# Patient Record
Sex: MALE | Race: WHITE | NOT HISPANIC OR LATINO | Employment: UNEMPLOYED | URBAN - METROPOLITAN AREA
[De-identification: names, ages, dates, MRNs, and addresses within clinical notes are randomized per-mention and may not be internally consistent; named-entity substitution may affect disease eponyms.]

---

## 2020-11-07 ENCOUNTER — APPOINTMENT (EMERGENCY)
Dept: RADIOLOGY | Facility: HOSPITAL | Age: 20
End: 2020-11-07
Payer: COMMERCIAL

## 2020-11-07 ENCOUNTER — HOSPITAL ENCOUNTER (OUTPATIENT)
Facility: HOSPITAL | Age: 20
Discharge: HOME | End: 2020-11-08
Attending: EMERGENCY MEDICINE | Admitting: SURGERY
Payer: COMMERCIAL

## 2020-11-07 ENCOUNTER — ANESTHESIA EVENT (EMERGENCY)
Dept: PERIOP | Facility: HOSPITAL | Age: 20
End: 2020-11-07
Payer: COMMERCIAL

## 2020-11-07 ENCOUNTER — ANESTHESIA (EMERGENCY)
Dept: PERIOP | Facility: HOSPITAL | Age: 20
End: 2020-11-07
Payer: COMMERCIAL

## 2020-11-07 VITALS — HEART RATE: 134 BPM

## 2020-11-07 DIAGNOSIS — G89.18 POSTOPERATIVE PAIN: ICD-10-CM

## 2020-11-07 DIAGNOSIS — A41.9 SEPSIS (HCC): ICD-10-CM

## 2020-11-07 DIAGNOSIS — K35.80 ACUTE APPENDICITIS: Primary | ICD-10-CM

## 2020-11-07 PROBLEM — J45.909 ASTHMA: Status: ACTIVE | Noted: 2020-11-07

## 2020-11-07 LAB
ALBUMIN SERPL BCP-MCNC: 4.2 G/DL (ref 3.5–5)
ALP SERPL-CCNC: 68 U/L (ref 46–116)
ALT SERPL W P-5'-P-CCNC: 24 U/L (ref 12–78)
ANION GAP SERPL CALCULATED.3IONS-SCNC: 9 MMOL/L (ref 4–13)
AST SERPL W P-5'-P-CCNC: 20 U/L (ref 5–45)
BASOPHILS # BLD AUTO: 0.05 THOUSANDS/ΜL (ref 0–0.1)
BASOPHILS NFR BLD AUTO: 0 % (ref 0–1)
BILIRUB DIRECT SERPL-MCNC: 0.2 MG/DL (ref 0–0.2)
BILIRUB SERPL-MCNC: 0.7 MG/DL (ref 0.2–1)
BUN SERPL-MCNC: 8 MG/DL (ref 5–25)
CALCIUM SERPL-MCNC: 8.9 MG/DL (ref 8.3–10.1)
CHLORIDE SERPL-SCNC: 103 MMOL/L (ref 100–108)
CO2 SERPL-SCNC: 26 MMOL/L (ref 21–32)
CREAT SERPL-MCNC: 0.94 MG/DL (ref 0.6–1.3)
EOSINOPHIL # BLD AUTO: 0 THOUSAND/ΜL (ref 0–0.61)
EOSINOPHIL NFR BLD AUTO: 0 % (ref 0–6)
ERYTHROCYTE [DISTWIDTH] IN BLOOD BY AUTOMATED COUNT: 12.5 % (ref 11.6–15.1)
GFR SERPL CREATININE-BSD FRML MDRD: 116 ML/MIN/1.73SQ M
GLUCOSE SERPL-MCNC: 118 MG/DL (ref 65–140)
HCT VFR BLD AUTO: 46 % (ref 36.5–49.3)
HGB BLD-MCNC: 15.5 G/DL (ref 12–17)
IMM GRANULOCYTES # BLD AUTO: 0.13 THOUSAND/UL (ref 0–0.2)
IMM GRANULOCYTES NFR BLD AUTO: 1 % (ref 0–2)
LACTATE SERPL-SCNC: 1.3 MMOL/L (ref 0.5–2)
LIPASE SERPL-CCNC: 97 U/L (ref 73–393)
LYMPHOCYTES # BLD AUTO: 0.93 THOUSANDS/ΜL (ref 0.6–4.47)
LYMPHOCYTES NFR BLD AUTO: 4 % (ref 14–44)
MCH RBC QN AUTO: 30.8 PG (ref 26.8–34.3)
MCHC RBC AUTO-ENTMCNC: 33.7 G/DL (ref 31.4–37.4)
MCV RBC AUTO: 92 FL (ref 82–98)
MONOCYTES # BLD AUTO: 1.8 THOUSAND/ΜL (ref 0.17–1.22)
MONOCYTES NFR BLD AUTO: 7 % (ref 4–12)
NEUTROPHILS # BLD AUTO: 21.45 THOUSANDS/ΜL (ref 1.85–7.62)
NEUTS SEG NFR BLD AUTO: 88 % (ref 43–75)
NRBC BLD AUTO-RTO: 0 /100 WBCS
PLATELET # BLD AUTO: 292 THOUSANDS/UL (ref 149–390)
PMV BLD AUTO: 9.7 FL (ref 8.9–12.7)
POTASSIUM SERPL-SCNC: 4 MMOL/L (ref 3.5–5.3)
PROT SERPL-MCNC: 7.7 G/DL (ref 6.4–8.2)
RBC # BLD AUTO: 5.03 MILLION/UL (ref 3.88–5.62)
SARS-COV-2 RNA RESP QL NAA+PROBE: NEGATIVE
SODIUM SERPL-SCNC: 138 MMOL/L (ref 136–145)
WBC # BLD AUTO: 24.36 THOUSAND/UL (ref 4.31–10.16)

## 2020-11-07 PROCEDURE — 83690 ASSAY OF LIPASE: CPT | Performed by: PHYSICIAN ASSISTANT

## 2020-11-07 PROCEDURE — 99285 EMERGENCY DEPT VISIT HI MDM: CPT | Performed by: PHYSICIAN ASSISTANT

## 2020-11-07 PROCEDURE — 44970 LAPAROSCOPY APPENDECTOMY: CPT | Performed by: SURGERY

## 2020-11-07 PROCEDURE — 87040 BLOOD CULTURE FOR BACTERIA: CPT | Performed by: PHYSICIAN ASSISTANT

## 2020-11-07 PROCEDURE — 96365 THER/PROPH/DIAG IV INF INIT: CPT

## 2020-11-07 PROCEDURE — 80048 BASIC METABOLIC PNL TOTAL CA: CPT | Performed by: PHYSICIAN ASSISTANT

## 2020-11-07 PROCEDURE — 85025 COMPLETE CBC W/AUTO DIFF WBC: CPT | Performed by: PHYSICIAN ASSISTANT

## 2020-11-07 PROCEDURE — 44970 LAPAROSCOPY APPENDECTOMY: CPT | Performed by: PHYSICIAN ASSISTANT

## 2020-11-07 PROCEDURE — 99284 EMERGENCY DEPT VISIT MOD MDM: CPT | Performed by: SURGERY

## 2020-11-07 PROCEDURE — 83605 ASSAY OF LACTIC ACID: CPT | Performed by: PHYSICIAN ASSISTANT

## 2020-11-07 PROCEDURE — 36415 COLL VENOUS BLD VENIPUNCTURE: CPT | Performed by: PHYSICIAN ASSISTANT

## 2020-11-07 PROCEDURE — G1004 CDSM NDSC: HCPCS

## 2020-11-07 PROCEDURE — 88304 TISSUE EXAM BY PATHOLOGIST: CPT | Performed by: PATHOLOGY

## 2020-11-07 PROCEDURE — 96375 TX/PRO/DX INJ NEW DRUG ADDON: CPT

## 2020-11-07 PROCEDURE — 99285 EMERGENCY DEPT VISIT HI MDM: CPT

## 2020-11-07 PROCEDURE — 80076 HEPATIC FUNCTION PANEL: CPT | Performed by: PHYSICIAN ASSISTANT

## 2020-11-07 PROCEDURE — 87635 SARS-COV-2 COVID-19 AMP PRB: CPT | Performed by: PHYSICIAN ASSISTANT

## 2020-11-07 PROCEDURE — 74177 CT ABD & PELVIS W/CONTRAST: CPT

## 2020-11-07 RX ORDER — KETOROLAC TROMETHAMINE 30 MG/ML
15 INJECTION, SOLUTION INTRAMUSCULAR; INTRAVENOUS EVERY 6 HOURS SCHEDULED
Status: DISCONTINUED | OUTPATIENT
Start: 2020-11-08 | End: 2020-11-08

## 2020-11-07 RX ORDER — SUCCINYLCHOLINE/SOD CL,ISO/PF 100 MG/5ML
SYRINGE (ML) INTRAVENOUS AS NEEDED
Status: DISCONTINUED | OUTPATIENT
Start: 2020-11-07 | End: 2020-11-07

## 2020-11-07 RX ORDER — MIDAZOLAM HYDROCHLORIDE 2 MG/2ML
INJECTION, SOLUTION INTRAMUSCULAR; INTRAVENOUS AS NEEDED
Status: DISCONTINUED | OUTPATIENT
Start: 2020-11-07 | End: 2020-11-07

## 2020-11-07 RX ORDER — ONDANSETRON 2 MG/ML
INJECTION INTRAMUSCULAR; INTRAVENOUS AS NEEDED
Status: DISCONTINUED | OUTPATIENT
Start: 2020-11-07 | End: 2020-11-07

## 2020-11-07 RX ORDER — SODIUM CHLORIDE 9 MG/ML
125 INJECTION, SOLUTION INTRAVENOUS CONTINUOUS
Status: DISCONTINUED | OUTPATIENT
Start: 2020-11-07 | End: 2020-11-07 | Stop reason: SDUPTHER

## 2020-11-07 RX ORDER — PROPOFOL 10 MG/ML
INJECTION, EMULSION INTRAVENOUS AS NEEDED
Status: DISCONTINUED | OUTPATIENT
Start: 2020-11-07 | End: 2020-11-07

## 2020-11-07 RX ORDER — HYDROMORPHONE HCL/PF 1 MG/ML
0.5 SYRINGE (ML) INJECTION
Status: DISCONTINUED | OUTPATIENT
Start: 2020-11-07 | End: 2020-11-08

## 2020-11-07 RX ORDER — ONDANSETRON 2 MG/ML
4 INJECTION INTRAMUSCULAR; INTRAVENOUS ONCE AS NEEDED
Status: DISCONTINUED | OUTPATIENT
Start: 2020-11-07 | End: 2020-11-07 | Stop reason: HOSPADM

## 2020-11-07 RX ORDER — OXYCODONE HYDROCHLORIDE AND ACETAMINOPHEN 5; 325 MG/1; MG/1
1 TABLET ORAL EVERY 4 HOURS PRN
Status: DISCONTINUED | OUTPATIENT
Start: 2020-11-07 | End: 2020-11-08 | Stop reason: HOSPADM

## 2020-11-07 RX ORDER — SODIUM CHLORIDE, SODIUM LACTATE, POTASSIUM CHLORIDE, CALCIUM CHLORIDE 600; 310; 30; 20 MG/100ML; MG/100ML; MG/100ML; MG/100ML
INJECTION, SOLUTION INTRAVENOUS CONTINUOUS PRN
Status: DISCONTINUED | OUTPATIENT
Start: 2020-11-07 | End: 2020-11-07

## 2020-11-07 RX ORDER — ROCURONIUM BROMIDE 10 MG/ML
INJECTION, SOLUTION INTRAVENOUS AS NEEDED
Status: DISCONTINUED | OUTPATIENT
Start: 2020-11-07 | End: 2020-11-07

## 2020-11-07 RX ORDER — FENTANYL CITRATE 50 UG/ML
INJECTION, SOLUTION INTRAMUSCULAR; INTRAVENOUS AS NEEDED
Status: DISCONTINUED | OUTPATIENT
Start: 2020-11-07 | End: 2020-11-07

## 2020-11-07 RX ORDER — ONDANSETRON 2 MG/ML
4 INJECTION INTRAMUSCULAR; INTRAVENOUS EVERY 6 HOURS PRN
Status: DISCONTINUED | OUTPATIENT
Start: 2020-11-07 | End: 2020-11-08

## 2020-11-07 RX ORDER — FENTANYL CITRATE/PF 50 MCG/ML
25 SYRINGE (ML) INJECTION
Status: DISCONTINUED | OUTPATIENT
Start: 2020-11-07 | End: 2020-11-07 | Stop reason: HOSPADM

## 2020-11-07 RX ORDER — ONDANSETRON 2 MG/ML
4 INJECTION INTRAMUSCULAR; INTRAVENOUS ONCE
Status: COMPLETED | OUTPATIENT
Start: 2020-11-07 | End: 2020-11-07

## 2020-11-07 RX ORDER — DEXAMETHASONE SODIUM PHOSPHATE 4 MG/ML
INJECTION, SOLUTION INTRA-ARTICULAR; INTRALESIONAL; INTRAMUSCULAR; INTRAVENOUS; SOFT TISSUE AS NEEDED
Status: DISCONTINUED | OUTPATIENT
Start: 2020-11-07 | End: 2020-11-07

## 2020-11-07 RX ORDER — SODIUM CHLORIDE 9 MG/ML
125 INJECTION, SOLUTION INTRAVENOUS CONTINUOUS
Status: DISCONTINUED | OUTPATIENT
Start: 2020-11-07 | End: 2020-11-08

## 2020-11-07 RX ORDER — DIPHENHYDRAMINE HYDROCHLORIDE 50 MG/ML
INJECTION INTRAMUSCULAR; INTRAVENOUS AS NEEDED
Status: DISCONTINUED | OUTPATIENT
Start: 2020-11-07 | End: 2020-11-07

## 2020-11-07 RX ADMIN — MIDAZOLAM 2 MG: 1 INJECTION INTRAMUSCULAR; INTRAVENOUS at 20:07

## 2020-11-07 RX ADMIN — FENTANYL CITRATE 25 MCG: 50 INJECTION INTRAMUSCULAR; INTRAVENOUS at 21:28

## 2020-11-07 RX ADMIN — SODIUM CHLORIDE 1000 ML: 0.9 INJECTION, SOLUTION INTRAVENOUS at 17:36

## 2020-11-07 RX ADMIN — Medication 100 MG: at 20:13

## 2020-11-07 RX ADMIN — SUGAMMADEX 300 MG: 100 INJECTION, SOLUTION INTRAVENOUS at 20:53

## 2020-11-07 RX ADMIN — SODIUM CHLORIDE, SODIUM LACTATE, POTASSIUM CHLORIDE, AND CALCIUM CHLORIDE: .6; .31; .03; .02 INJECTION, SOLUTION INTRAVENOUS at 20:06

## 2020-11-07 RX ADMIN — PIPERACILLIN AND TAZOBACTAM 3.38 G: 3; .375 INJECTION, POWDER, FOR SOLUTION INTRAVENOUS at 18:24

## 2020-11-07 RX ADMIN — FENTANYL CITRATE 25 MCG: 50 INJECTION INTRAMUSCULAR; INTRAVENOUS at 21:16

## 2020-11-07 RX ADMIN — PROPOFOL 150 MG: 10 INJECTION, EMULSION INTRAVENOUS at 20:13

## 2020-11-07 RX ADMIN — SODIUM CHLORIDE 125 ML/HR: 0.9 INJECTION, SOLUTION INTRAVENOUS at 22:48

## 2020-11-07 RX ADMIN — ONDANSETRON 4 MG: 2 INJECTION INTRAMUSCULAR; INTRAVENOUS at 17:36

## 2020-11-07 RX ADMIN — ONDANSETRON 4 MG: 2 INJECTION INTRAMUSCULAR; INTRAVENOUS at 20:15

## 2020-11-07 RX ADMIN — DEXAMETHASONE SODIUM PHOSPHATE 4 MG: 4 INJECTION, SOLUTION INTRA-ARTICULAR; INTRALESIONAL; INTRAMUSCULAR; INTRAVENOUS; SOFT TISSUE at 20:15

## 2020-11-07 RX ADMIN — FENTANYL CITRATE 50 MCG: 50 INJECTION, SOLUTION INTRAMUSCULAR; INTRAVENOUS at 20:13

## 2020-11-07 RX ADMIN — ROCURONIUM BROMIDE 25 MG: 10 INJECTION, SOLUTION INTRAVENOUS at 20:15

## 2020-11-07 RX ADMIN — IOHEXOL 100 ML: 350 INJECTION, SOLUTION INTRAVENOUS at 17:51

## 2020-11-07 RX ADMIN — DIPHENHYDRAMINE HYDROCHLORIDE 50 MG: 50 INJECTION INTRAMUSCULAR; INTRAVENOUS at 20:11

## 2020-11-08 VITALS
WEIGHT: 146 LBS | OXYGEN SATURATION: 97 % | SYSTOLIC BLOOD PRESSURE: 134 MMHG | BODY MASS INDEX: 20.44 KG/M2 | HEIGHT: 71 IN | TEMPERATURE: 98.6 F | DIASTOLIC BLOOD PRESSURE: 57 MMHG | HEART RATE: 76 BPM | RESPIRATION RATE: 18 BRPM

## 2020-11-08 PROCEDURE — 99024 POSTOP FOLLOW-UP VISIT: CPT | Performed by: SURGERY

## 2020-11-08 RX ORDER — OXYCODONE HYDROCHLORIDE AND ACETAMINOPHEN 5; 325 MG/1; MG/1
1 TABLET ORAL EVERY 4 HOURS PRN
Qty: 8 TABLET | Refills: 0 | Status: SHIPPED | OUTPATIENT
Start: 2020-11-08 | End: 2021-09-03

## 2020-11-08 RX ORDER — IBUPROFEN 600 MG/1
600 TABLET ORAL EVERY 6 HOURS SCHEDULED
Status: DISCONTINUED | OUTPATIENT
Start: 2020-11-08 | End: 2020-11-08 | Stop reason: HOSPADM

## 2020-11-08 RX ADMIN — IBUPROFEN 600 MG: 600 TABLET, FILM COATED ORAL at 08:30

## 2020-11-08 RX ADMIN — SODIUM CHLORIDE 125 ML/HR: 0.9 INJECTION, SOLUTION INTRAVENOUS at 06:38

## 2020-11-13 LAB
BACTERIA BLD CULT: NORMAL
BACTERIA BLD CULT: NORMAL

## 2021-09-03 ENCOUNTER — OFFICE VISIT (OUTPATIENT)
Dept: URGENT CARE | Facility: CLINIC | Age: 21
End: 2021-09-03
Payer: COMMERCIAL

## 2021-09-03 VITALS
SYSTOLIC BLOOD PRESSURE: 120 MMHG | RESPIRATION RATE: 18 BRPM | HEIGHT: 71 IN | TEMPERATURE: 99.4 F | WEIGHT: 145.4 LBS | HEART RATE: 93 BPM | BODY MASS INDEX: 20.35 KG/M2 | OXYGEN SATURATION: 97 % | DIASTOLIC BLOOD PRESSURE: 60 MMHG

## 2021-09-03 DIAGNOSIS — K04.7 DENTAL ABSCESS: Primary | ICD-10-CM

## 2021-09-03 PROCEDURE — 99213 OFFICE O/P EST LOW 20 MIN: CPT | Performed by: PHYSICIAN ASSISTANT

## 2021-09-03 RX ORDER — CEPHALEXIN 500 MG/1
500 CAPSULE ORAL EVERY 8 HOURS SCHEDULED
Qty: 21 CAPSULE | Refills: 0 | Status: SHIPPED | OUTPATIENT
Start: 2021-09-03 | End: 2021-09-10

## 2021-09-03 RX ORDER — LEVOCETIRIZINE DIHYDROCHLORIDE 5 MG/1
5 TABLET, FILM COATED ORAL
COMMUNITY

## 2021-09-03 RX ORDER — IBUPROFEN 600 MG/1
600 TABLET ORAL EVERY 6 HOURS PRN
Qty: 30 TABLET | Refills: 0 | Status: SHIPPED | OUTPATIENT
Start: 2021-09-03 | End: 2021-09-10

## 2021-09-03 NOTE — PROGRESS NOTES
[facility]        NAME: Karen De La Rosa is a 24 y o  male  : 2000    MRN: [id number]  DATE: September 3, 2021  TIME: 6:07 PM    Assessment and Plan   Dental abscess [K04 7]  1  Dental abscess  cephalexin (KEFLEX) 500 mg capsule    ibuprofen (MOTRIN) 600 mg tablet     Patient Instructions     Take the antibiotic as directed with food  While on this medication begin a probiotic like yogurt  Alternate tylenol and ibuprofen for pain relief  Use orajel mouth wash for residual pain  Follow up with a dentist in 3-5 days  Proceed to the ER if symptoms worsen  Chief Complaint     Chief Complaint   Patient presents with    Dental Pain     Cracked bottom L tooth - molar x 6 months  Started with dental pain last night  Radiates to jaw and ear  Tooth XS Tylenol at 5:30 pm       History of Present Illness     25 y/o male with c/o dental abscess  He reports pain of the left lower molar that began today  Reports a cracked tooth in area of pain, that occurred years ago  Has not followed with dentist regarding tx  Reports gum swelling but no facial swelling or redness  No F/C/S, difficulty or pain with jaw movement, or headache  Tx with tylenol without relief  Review of Systems   Review of Systems   Constitutional: Negative for chills, diaphoresis and fever  HENT: Positive for dental problem  Negative for congestion and sore throat  Respiratory: Negative for cough  Gastrointestinal: Negative for abdominal pain, nausea and vomiting  Skin: Negative for color change  Neurological: Negative for dizziness and headaches           Current Medications       Current Outpatient Medications:     levocetirizine (XYZAL) 5 MG tablet, Take 5 mg by mouth daily at bedtime as needed for allergies, Disp: , Rfl:     cephalexin (KEFLEX) 500 mg capsule, Take 1 capsule (500 mg total) by mouth every 8 (eight) hours for 7 days, Disp: 21 capsule, Rfl: 0    ibuprofen (MOTRIN) 600 mg tablet, Take 1 tablet (600 mg total) by mouth every 6 (six) hours as needed for mild pain or moderate pain for up to 7 days, Disp: 30 tablet, Rfl: 0    Current Allergies     Allergies as of 09/03/2021 - Reviewed 09/03/2021   Allergen Reaction Noted    Penicillins Other (See Comments) 11/07/2020            The following portions of the patient's history were reviewed and updated as appropriate: allergies, current medications, past family history, past medical history, past social history, past surgical history and problem list      Past Medical History:   Diagnosis Date    Allergic rhinitis     Asthma     childhood    Heart murmur        Past Surgical History:   Procedure Laterality Date    APPENDECTOMY      LYMPH NODE BIOPSY      IN LAP,APPENDECTOMY N/A 11/7/2020    Procedure: Macel Brenden;  Surgeon: Marquis Valdes MD;  [facility];  Service: General       No family history on file  Medications have been verified  Objective   /60   Pulse 93   Temp 99 4 °F (37 4 °C)   Resp 18   Ht 5' 11" (1 803 m)   Wt 66 kg (145 lb 6 4 oz)   SpO2 97%   BMI 20 28 kg/m²   No LMP for male patient  Physical Exam     Physical Exam  Vitals and nursing note reviewed  Constitutional:       General: He is not in acute distress  Appearance: He is well-developed  He is not ill-appearing or diaphoretic  HENT:      Head: Normocephalic and atraumatic  Mouth/Throat:      Dentition: Abnormal dentition (fracture or left lower molar)  Dental abscesses (of the left lower gingiva) present  Eyes:      General: Lids are normal          Right eye: No discharge  Left eye: No discharge  Conjunctiva/sclera: Conjunctivae normal    Cardiovascular:      Rate and Rhythm: Normal rate and regular rhythm  Heart sounds: Normal heart sounds  Pulmonary:      Effort: Pulmonary effort is normal       Breath sounds: Normal breath sounds  No decreased breath sounds, wheezing, rhonchi or rales     Lymphadenopathy: Head:      Left side of head: Submandibular (tender) adenopathy present  Skin:     General: Skin is warm and dry  Neurological:      General: No focal deficit present  Mental Status: He is alert  He is not disoriented  Gait: Gait normal    Psychiatric:         Attention and Perception: Attention normal          Behavior: Behavior is cooperative  Thought Content:  Thought content normal

## 2021-09-03 NOTE — PATIENT INSTRUCTIONS
Take the antibiotic as directed with food  While on this medication begin a probiotic like yogurt  Alternate tylenol and ibuprofen for pain relief  Use orajel mouth wash for residual pain  Follow up with a dentist in 3-5 days  Proceed to the ER if symptoms worsen  Dental Abscess   WHAT YOU NEED TO KNOW:   A dental abscess is a collection of pus in or around a tooth  A dental abscess is caused by bacteria  The bacteria can enter the tooth when the enamel (outer part of the tooth) is damaged by tooth decay  Bacteria can also enter the tooth through a chip in the tooth or a cut in the gum  Food particles that are stuck between the teeth for a long time may also lead to an abscess  DISCHARGE INSTRUCTIONS:   Return to the emergency department if:   · You have severe pain in your tooth or jaw  · You have trouble breathing because of pain or swelling  Call your doctor if:   · Your symptoms get worse, even after treatment  · Your mouth is bleeding  · You cannot eat or drink because of pain or swelling  · Your abscess returns  · You have an injury that causes a crack in your tooth  · You have questions or concerns about your condition or care  Medicines: You may  need any of the following:  · Antibiotics  help treat a bacterial infection  · NSAIDs , such as ibuprofen, help decrease swelling, pain, and fever  This medicine is available with or without a doctor's order  NSAIDs can cause stomach bleeding or kidney problems in certain people  If you take blood thinner medicine, always ask your healthcare provider if NSAIDs are safe for you  Always read the medicine label and follow directions  · Acetaminophen  decreases pain and fever  It is available without a doctor's order  Ask how much to take and how often to take it  Follow directions   Read the labels of all other medicines you are using to see if they also contain acetaminophen, or ask your doctor or pharmacist  Acetaminophen can cause liver damage if not taken correctly  Do not use more than 4 grams (4,000 milligrams) total of acetaminophen in one day  · Prescription pain medicine  may be given  Ask your healthcare provider how to take this medicine safely  Some prescription pain medicines contain acetaminophen  Do not take other medicines that contain acetaminophen without talking to your healthcare provider  Too much acetaminophen may cause liver damage  Prescription pain medicine may cause constipation  Ask your healthcare provider how to prevent or treat constipation  · Take your medicine as directed  Contact your healthcare provider if you think your medicine is not helping or if you have side effects  Tell him of her if you are allergic to any medicine  Keep a list of the medicines, vitamins, and herbs you take  Include the amounts, and when and why you take them  Bring the list or the pill bottles to follow-up visits  Carry your medicine list with you in case of an emergency  Self-care:   · Rinse your mouth every 2 hours with salt water  This will help keep the area clean  · Gently brush your teeth twice a day with a soft tooth brush  This will help keep the area clean  · Eat soft foods as directed  Soft foods may cause less pain  Examples include applesauce, yogurt, and cooked pasta  Ask your healthcare provider how long to follow this instruction  · Apply a warm compress to your tooth or gum  Use a cotton ball or gauze soaked in warm water  Remove the compress in 10 minutes or when it becomes cool  Repeat 3 times a day  Prevent another abscess:   · Brush your teeth at least 2 times a day  with fluoride toothpaste  · Use dental floss at least once a day  to clean between your teeth  · Rinse your mouth with water or mouthwash  after meals and snacks  Chew sugarless gum  · Avoid sugary and starchy food that can stick between your teeth    Limit drinks high in sugar, such as soda or fruit juice     · See your dentist every 6 months  for dental cleanings and oral exams  Follow up with your doctor or dentist in 24 hours, or as directed: Your healthcare provider will need to check your teeth and gums  Write down your questions so you remember to ask them during your visits  © Copyright [employer] 2021 Information is for End User's use only and may not be sold, redistributed or otherwise used for commercial purposes  All illustrations and images included in CareNotes® are the copyrighted property of [employer]  or [facility] Felisha Ray   The above information is an  only  It is not intended as medical advice for individual conditions or treatments  Talk to your doctor, nurse or pharmacist before following any medical regimen to see if it is safe and effective for you  Consent: Written consent was obtained and risks were reviewed including but not limited to scarring, infection, bleeding, scabbing, incomplete removal, nerve damage and allergy to anesthesia.

## 2024-09-14 ENCOUNTER — HOSPITAL ENCOUNTER (EMERGENCY)
Facility: HOSPITAL | Age: 24
End: 2024-09-14
Payer: COMMERCIAL

## 2024-09-14 VITALS
DIASTOLIC BLOOD PRESSURE: 72 MMHG | RESPIRATION RATE: 16 BRPM | OXYGEN SATURATION: 98 % | TEMPERATURE: 97.9 F | SYSTOLIC BLOOD PRESSURE: 110 MMHG | HEART RATE: 87 BPM

## 2024-09-14 DIAGNOSIS — F10.929 ALCOHOL INTOXICATION (HCC): ICD-10-CM

## 2024-09-14 DIAGNOSIS — R45.851 SUICIDAL IDEATION: Primary | ICD-10-CM

## 2024-09-14 LAB
ALBUMIN SERPL BCG-MCNC: 4.5 G/DL (ref 3.5–5)
ALP SERPL-CCNC: 72 U/L (ref 34–104)
ALT SERPL W P-5'-P-CCNC: 20 U/L (ref 7–52)
AMPHETAMINES SERPL QL SCN: NEGATIVE
ANION GAP SERPL CALCULATED.3IONS-SCNC: 11 MMOL/L (ref 4–13)
AST SERPL W P-5'-P-CCNC: 19 U/L (ref 13–39)
ATRIAL RATE: 90 BPM
BARBITURATES UR QL: NEGATIVE
BASE EX.OXY STD BLDV CALC-SCNC: 67.2 % (ref 60–80)
BASE EXCESS BLDV CALC-SCNC: -0.6 MMOL/L
BASOPHILS # BLD AUTO: 0.1 THOUSANDS/ΜL (ref 0–0.1)
BASOPHILS NFR BLD AUTO: 1 % (ref 0–1)
BENZODIAZ UR QL: NEGATIVE
BILIRUB SERPL-MCNC: 0.3 MG/DL (ref 0.2–1)
BILIRUB UR QL STRIP: NEGATIVE
BUN SERPL-MCNC: 9 MG/DL (ref 5–25)
CALCIUM SERPL-MCNC: 8.7 MG/DL (ref 8.4–10.2)
CHLORIDE SERPL-SCNC: 104 MMOL/L (ref 96–108)
CLARITY UR: CLEAR
CO2 SERPL-SCNC: 27 MMOL/L (ref 21–32)
COCAINE UR QL: NEGATIVE
COLOR UR: NORMAL
CREAT SERPL-MCNC: 0.82 MG/DL (ref 0.6–1.3)
EOSINOPHIL # BLD AUTO: 0.11 THOUSAND/ΜL (ref 0–0.61)
EOSINOPHIL NFR BLD AUTO: 1 % (ref 0–6)
ERYTHROCYTE [DISTWIDTH] IN BLOOD BY AUTOMATED COUNT: 12.7 % (ref 11.6–15.1)
ETHANOL EXG-MCNC: 0.15 MG/DL
FENTANYL UR QL SCN: NEGATIVE
GFR SERPL CREATININE-BSD FRML MDRD: 123 ML/MIN/1.73SQ M
GLUCOSE SERPL-MCNC: 98 MG/DL (ref 65–140)
GLUCOSE UR STRIP-MCNC: NEGATIVE MG/DL
HCO3 BLDV-SCNC: 27 MMOL/L (ref 24–30)
HCT VFR BLD AUTO: 51.4 % (ref 36.5–49.3)
HGB BLD-MCNC: 17.5 G/DL (ref 12–17)
HGB UR QL STRIP.AUTO: NEGATIVE
HYDROCODONE UR QL SCN: NEGATIVE
IMM GRANULOCYTES # BLD AUTO: 0.07 THOUSAND/UL (ref 0–0.2)
IMM GRANULOCYTES NFR BLD AUTO: 0 % (ref 0–2)
KETONES UR STRIP-MCNC: NEGATIVE MG/DL
LEUKOCYTE ESTERASE UR QL STRIP: NEGATIVE
LIPASE SERPL-CCNC: 30 U/L (ref 11–82)
LYMPHOCYTES # BLD AUTO: 2.77 THOUSANDS/ΜL (ref 0.6–4.47)
LYMPHOCYTES NFR BLD AUTO: 18 % (ref 14–44)
MAGNESIUM SERPL-MCNC: 1.9 MG/DL (ref 1.9–2.7)
MCH RBC QN AUTO: 32.3 PG (ref 26.8–34.3)
MCHC RBC AUTO-ENTMCNC: 34 G/DL (ref 31.4–37.4)
MCV RBC AUTO: 95 FL (ref 82–98)
METHADONE UR QL: NEGATIVE
MONOCYTES # BLD AUTO: 1.07 THOUSAND/ΜL (ref 0.17–1.22)
MONOCYTES NFR BLD AUTO: 7 % (ref 4–12)
NEUTROPHILS # BLD AUTO: 11.69 THOUSANDS/ΜL (ref 1.85–7.62)
NEUTS SEG NFR BLD AUTO: 73 % (ref 43–75)
NITRITE UR QL STRIP: NEGATIVE
NRBC BLD AUTO-RTO: 0 /100 WBCS
O2 CT BLDV-SCNC: 17.8 ML/DL
OPIATES UR QL SCN: NEGATIVE
OXYCODONE+OXYMORPHONE UR QL SCN: NEGATIVE
P AXIS: -2 DEGREES
PCO2 BLDV: 54.7 MM HG (ref 42–50)
PCP UR QL: NEGATIVE
PH BLDV: 7.31 [PH] (ref 7.3–7.4)
PH UR STRIP.AUTO: 6.5 [PH]
PLATELET # BLD AUTO: 277 THOUSANDS/UL (ref 149–390)
PMV BLD AUTO: 8.9 FL (ref 8.9–12.7)
PO2 BLDV: 39.4 MM HG (ref 35–45)
POTASSIUM SERPL-SCNC: 4 MMOL/L (ref 3.5–5.3)
PR INTERVAL: 172 MS
PROT SERPL-MCNC: 7.6 G/DL (ref 6.4–8.4)
PROT UR STRIP-MCNC: NEGATIVE MG/DL
QRS AXIS: -26 DEGREES
QRSD INTERVAL: 106 MS
QT INTERVAL: 350 MS
QTC INTERVAL: 428 MS
RBC # BLD AUTO: 5.41 MILLION/UL (ref 3.88–5.62)
SODIUM SERPL-SCNC: 142 MMOL/L (ref 135–147)
SP GR UR STRIP.AUTO: 1.01 (ref 1–1.03)
T WAVE AXIS: -8 DEGREES
THC UR QL: NEGATIVE
UROBILINOGEN UR STRIP-ACNC: <2 MG/DL
VENTRICULAR RATE: 90 BPM
WBC # BLD AUTO: 15.81 THOUSAND/UL (ref 4.31–10.16)

## 2024-09-14 PROCEDURE — 36415 COLL VENOUS BLD VENIPUNCTURE: CPT

## 2024-09-14 PROCEDURE — 83690 ASSAY OF LIPASE: CPT

## 2024-09-14 PROCEDURE — 81003 URINALYSIS AUTO W/O SCOPE: CPT

## 2024-09-14 PROCEDURE — 93005 ELECTROCARDIOGRAM TRACING: CPT

## 2024-09-14 PROCEDURE — 93010 ELECTROCARDIOGRAM REPORT: CPT | Performed by: INTERNAL MEDICINE

## 2024-09-14 PROCEDURE — 99285 EMERGENCY DEPT VISIT HI MDM: CPT

## 2024-09-14 PROCEDURE — 83735 ASSAY OF MAGNESIUM: CPT

## 2024-09-14 PROCEDURE — 82075 ASSAY OF BREATH ETHANOL: CPT

## 2024-09-14 PROCEDURE — 80307 DRUG TEST PRSMV CHEM ANLYZR: CPT

## 2024-09-14 PROCEDURE — 80053 COMPREHEN METABOLIC PANEL: CPT

## 2024-09-14 PROCEDURE — 82805 BLOOD GASES W/O2 SATURATION: CPT

## 2024-09-14 PROCEDURE — 85025 COMPLETE CBC W/AUTO DIFF WBC: CPT

## 2024-09-14 RX ADMIN — NICOTINE POLACRILEX 4 MG: 2 GUM, CHEWING BUCCAL at 13:45

## 2024-09-14 NOTE — ED NOTES
CW placed call to [facility], [phone], spoke w/Coco. As per Coco a clinician will return call as we were on hold waiting for aprox 20 mins.     1340 - CW received return call from Brady COELHO to complete prior auth request    1354 - CW placed call to [facility], spoke w/Angela and provided info.    Insurance Authorization for admission:   Phone call placed to [facility]/  Phone number: [phone]     Spoke to Brady COELHO     3 days approved.  Level of care: 201  Review on 9/16/2024   Authorization # [id number]        Eligibility Verification System checked - ([phone]).  Online system / automated system indicates: **    Insurance Authorization for Transportation:    Phone call placed to **.   Phone number **.   Spoke to **.   Authorization #: **    MILO YOUNG

## 2024-09-14 NOTE — ED NOTES
CW notes, pt's BAL appears to be slightly above the legal limit at this time. CW will assess pt once BAL is within the normal range.    TDS, CW

## 2024-09-14 NOTE — ED NOTES
CW notes, pt has signed 201 and Dr. Ayon has completed.     CW read and reviewed 201 rights w/pt.     CW notes pt's BAL is at .08 at this time as per ED provider.    TDS, CW

## 2024-09-14 NOTE — ED NOTES
PENDING UDS RESULTS     Patient is accepted at [facility].  Patient is accepted by Dr. Mercy Miller.     Transportation is arranged with TBD.    Transportation is scheduled for TBD.   Patient may go to the floor at anytime.      TDS, CW

## 2024-09-14 NOTE — ED NOTES
CW provided pt's nurse w/transportation packet    CW placed call to [facility], spoke w/Angela, and provided ETA    CW provided pt w/ETA    TDS, CW

## 2024-09-14 NOTE — ED NOTES
Pt and Dr. Ayon have signed and completed the EMTALA.    CW placed request for transport in [facility]    CW prepared transportation packet    TDS, CW

## 2024-09-14 NOTE — ED PROVIDER NOTES
1. Suicidal ideation    2. Alcohol intoxication (HCC)      ED Disposition       ED Disposition   Transfer to Behavioral Health Condition   --    Date/Time   Sat Sep 14, 2024 10:01 AM    Comment   Prosper Baltazar should be transferred to inpatient psychiatric facility               Assessment & Plan       Medical Decision Making  Amount and/or Complexity of Data Reviewed  Labs: ordered. Decision-making details documented in ED Course.    Risk  OTC drugs.  Decision regarding hospitalization.      Patient is a 24 year old male presenting to the ED for evaluation of suicidal ideation with plan to shoot himself with firearm. Also hx of alcohol intake daily. History and clinical exam documented below. Labs ordered for medical clearance given alcohol use daily. No signs of withdrawal during ED evaluation. Labs reviewed; no acute metabolic derangements. Patient was evaluated by crisis intervention and agreed to sign 201 order for voluntary psychiatric admission. Patient calm and cooperative during ED evaluation. Transferred to [facility] at 1500 without issue.            ED Course as of 09/14/24 1525   Sat Sep 14, 2024   0927 EXTBreath Alcohol: 0.148   1011 I spoke to patient about inpatient psychiatric treatment and he is willing to sign 201 for voluntary treatment. Case discussed with crisis worker; will evaluate patient once medically cleared and under the legal limit for EtOH.   1012 Patient offered nicotine patch, does not want at this time     1020 EKG: NSR at 90 bpm, LAD, normal intervals, no acute ischemic changes as interpreted by me.   1026 WBC(!): 15.81  Likely stress related; hemoconcentration as well. Patient is afebrile.   1026 pH, Gene: 7.312   1026 HCO3, Gene: 27.0  No evidence of acidosis     1048 Sodium: 142   1048 Potassium: 4.0   1048 MAGNESIUM: 1.9  WNL     1147 Patient is clinically sober with clear speech and steady gait. Labs reviewed; no acute abnormalities. Patient spoke to crisis worker and  "agrees to sign 201. Patient is medically cleared for inpatient psychiatric treatment.   1214 201 signed for voluntary inpatient treatment.   1225 Repeat POC EtOH: 0.088   1244 Patient has been accepted to [facility]. Pickup time pending.     1245 Ketones, UA: Negative   1259 Rapid drug screen, urine  Unremarkable     1502 On subsequent re-evaluation, patient has remained stable in the ED. No acute signs of alcohol withdrawal. Requesting nicotine gum. Given. Transport time 1500. Patient calm and cooperative at this time.       Medications   nicotine (NICODERM CQ) 7 mg/24hr TD 24 hr patch 7 mg (0 mg Transdermal Hold 9/14/24 1054)   nicotine polacrilex (NICORETTE) gum 4 mg (4 mg Oral Given 9/14/24 1345)       History of Present Illness       HPI    Patient is a 24-year-old male with a history of asthma, presenting to the ED for evaluation of suicidal ideation with plan. Patient states that he has a long history of passive SI since he was 13 years old. States that he has \"so much going on\" in his life that has contributed to his feelings. He remembers times when he was younger, where he would hold a knife to his wrist and imaging cutting himself. States that over the past few weeks he has been feeling increasingly suicidal, has felt isolated, and reports increased feeling of depression. Last night, he called the suicide hotline because he was going to act on his suicidal ideation and shoot himself with his firearm. Patient states that he has actually been holding the loaded gun to his head and his chest over the past few weeks but did not act on his thoughts. Patient told his mom and brother about this and they have since removed patient's access to his firearm and have brought patient to the ED for evaluation. Patient states that he has a good relationship with his mom. States that he did not go through with his plan because of his relationship with his brother and sister. Patient states that he has never seen a " therapist or a psychiatrist. States that he has never been on psychiatric medications. He currently denies any homicidal ideation, auditory or visual hallucinations.    Patient states that he also drinks alcohol daily. States that he can finish a handle of Vodka in 3 days. States that his last alcohol drink was 1 hour prior to arrival. States that he has been drinking daily for nearly 1 year. He uses alcohol as a way of inducing sleep at times. Patient states that he has been able to stop drinking when he wants. He has experienced tremors during withdrawal, but has never experienced alcohol withdrawal seizures.    Patient currently denies any somatic complaints, including fevers, chills, cough or congestion, abdominal pain, nausea, vomiting, diarrhea, urinary complaints.    Review of Systems   All other systems reviewed and are negative.          Objective     ED Triage Vitals   Temperature Pulse Blood Pressure Respirations SpO2 Patient Position - Orthostatic VS   09/14/24 0922 09/14/24 0922 09/14/24 0922 09/14/24 0922 09/14/24 0922 --   97.9 °F (36.6 °C) 99 132/80 18 99 %       Temp src Heart Rate Source BP Location FiO2 (%) Pain Score    -- 09/14/24 0922 09/14/24 1453 -- --     Monitor Left arm          Physical Exam  Vitals and nursing note reviewed.   Constitutional:       General: He is not in acute distress.     Appearance: Normal appearance. He is well-developed and normal weight. He is not ill-appearing, toxic-appearing or diaphoretic.   HENT:      Head: Normocephalic and atraumatic.      Right Ear: External ear normal.      Left Ear: External ear normal.      Nose: Nose normal. No congestion or rhinorrhea.      Mouth/Throat:      Mouth: Mucous membranes are moist.      Pharynx: Oropharynx is clear.   Eyes:      General: No scleral icterus.     Extraocular Movements: Extraocular movements intact.      Conjunctiva/sclera: Conjunctivae normal.      Pupils: Pupils are equal, round, and reactive to light.    Cardiovascular:      Rate and Rhythm: Normal rate and regular rhythm.      Pulses: Normal pulses.      Heart sounds: Normal heart sounds. No murmur heard.  Pulmonary:      Effort: Pulmonary effort is normal. No respiratory distress.      Breath sounds: Normal breath sounds. No wheezing, rhonchi or rales.   Chest:      Chest wall: No tenderness.   Abdominal:      General: Abdomen is flat. Bowel sounds are normal.      Palpations: Abdomen is soft.      Tenderness: There is no abdominal tenderness. There is no guarding or rebound.   Musculoskeletal:         General: No swelling. Normal range of motion.      Cervical back: Normal range of motion and neck supple. No tenderness.      Right lower leg: No edema.      Left lower leg: No edema.   Skin:     General: Skin is warm and dry.      Capillary Refill: Capillary refill takes less than 2 seconds.      Findings: No erythema.   Neurological:      General: No focal deficit present.      Mental Status: He is alert and oriented to person, place, and time.      Cranial Nerves: No cranial nerve deficit.      Sensory: No sensory deficit.      Motor: No weakness, tremor, seizure activity or pronator drift.      Coordination: Coordination normal. Finger-Nose-Finger Test normal.      Gait: Gait is intact. Gait normal.   Psychiatric:         Mood and Affect: Mood normal. Affect is flat.         Speech: Speech normal. Speech is not rapid and pressured or tangential.         Behavior: Behavior normal. Behavior is not agitated or aggressive. Behavior is cooperative.         Thought Content: Thought content is not paranoid or delusional. Thought content includes suicidal ideation. Thought content does not include homicidal ideation. Thought content includes suicidal plan. Thought content does not include homicidal plan.         Labs Reviewed   CBC AND DIFFERENTIAL - Abnormal       Result Value    WBC 15.81 (*)     RBC 5.41      Hemoglobin 17.5 (*)     Hematocrit 51.4 (*)     MCV 95       MCH 32.3      MCHC 34.0      RDW 12.7      MPV 8.9      Platelets 277      nRBC 0      Segmented % 73      Immature Grans % 0      Lymphocytes % 18      Monocytes % 7      Eosinophils Relative 1      Basophils Relative 1      Absolute Neutrophils 11.69 (*)     Absolute Immature Grans 0.07      Absolute Lymphocytes 2.77      Absolute Monocytes 1.07      Eosinophils Absolute 0.11      Basophils Absolute 0.10     BLOOD GAS, VENOUS - Abnormal    pH, Gene 7.312      pCO2, Gene 54.7 (*)     pO2, Gene 39.4      HCO3, Gene 27.0      Base Excess, Gene -0.6      O2 Content, Gene 17.8      O2 HGB, VENOUS 67.2     RAPID DRUG SCREEN, URINE - Normal    Amph/Meth UR Negative      Barbiturate Ur Negative      Benzodiazepine Urine Negative      Cocaine Urine Negative      Methadone Urine Negative      Opiate Urine Negative      PCP Ur Negative      THC Urine Negative      Oxycodone Urine Negative      Fentanyl Urine Negative      HYDROCODONE URINE Negative      Narrative:     FOR MEDICAL PURPOSES ONLY.   IF CONFIRMATION NEEDED PLEASE CONTACT THE LAB WITHIN 5 DAYS.    Drug Screen Cutoff Levels:  AMPHETAMINE/METHAMPHETAMINES  1000 ng/mL  BARBITURATES     200 ng/mL  BENZODIAZEPINES     200 ng/mL  COCAINE      300 ng/mL  METHADONE      300 ng/mL  OPIATES      300 ng/mL  PHENCYCLIDINE     25 ng/mL  THC       50 ng/mL  OXYCODONE      100 ng/mL  FENTANYL      5 ng/mL  HYDROCODONE     300 ng/mL   LIPASE - Normal    Lipase 30     MAGNESIUM - Normal    Magnesium 1.9     POCT ALCOHOL BREATH TEST - Normal    EXTBreath Alcohol 0.148     COMPREHENSIVE METABOLIC PANEL    Sodium 142      Potassium 4.0      Chloride 104      CO2 27      ANION GAP 11      BUN 9      Creatinine 0.82      Glucose 98      Calcium 8.7      AST 19      ALT 20      Alkaline Phosphatase 72      Total Protein 7.6      Albumin 4.5      Total Bilirubin 0.30      eGFR 123      Narrative:     National Kid[employer] Disease Foundation guidelines for Chronic Kidney Disease (CKD):     Stage  1 with normal or high GFR (GFR > 90 mL/min/1.73 square meters)    Stage 2 Mild CKD (GFR = 60-89 mL/min/1.73 square meters)    Stage 3A Moderate CKD (GFR = 45-59 mL/min/1.73 square meters)    Stage 3B Moderate CKD (GFR = 30-44 mL/min/1.73 square meters)    Stage 4 Severe CKD (GFR = 15-29 mL/min/1.73 square meters)    Stage 5 End Stage CKD (GFR <15 mL/min/1.73 square meters)  Note: GFR calculation is accurate only with a steady state creatinine   UA W REFLEX TO MICROSCOPIC WITH REFLEX TO CULTURE    Color, UA Light Yellow      Clarity, UA Clear      Specific Gravity, UA 1.012      pH, UA 6.5      Leukocytes, UA Negative      Nitrite, UA Negative      Protein, UA Negative      Glucose, UA Negative      Ketones, UA Negative      Urobilinogen, UA <2.0      Bilirubin, UA Negative      Occult Blood, UA Negative       No orders to display       Procedures       Gerardo Ayon, DO  09/14/24 3337

## 2024-09-14 NOTE — ED NOTES
"Pt presents to the ED from home. Pt arrives w/BAL slightly above the legal limit. Pt reports SI's since the age of 13. Pt denies HI's and or AVH's. Pt reports hx of anxiety, depression, and ADHD. Pt denies having prior or current IP or OP mental health tx. Pt reports ongoing use of ETOH on a daily basis for aprox 1.5 yrs. Pt reports drinking aprox \"7-10 doubles\" daily (1 handle will last aprox 3 days). Pt denies use of illegal substances at this time. Pt reports smoking aprox 10 cigarettes only while drinking. Pt is cooperative but appears slightly agitated when discussing tx. Pt states, \"What exactly are they going to do for the drinking? I think I can just cut down to the weekends only on my own.\" Pt reports having held a loaded gun to head and or chest recently. Pt appears guarded w/exact time lines; however, pt states, \"I didn't do it because I didn't want my mom to find me in that condition w/my face fucked up. I then held it to my chest but then thought what if it wasn't successful and then the situation would be worse.\" Pt adds, \"I did not have my gun last night because if I did I am certain I would've done it.\" Pt cannot provide any stressor or trigger at this time. Pt reports, \"Mental health was never really an option for discussion in my family or to have treated.\" Pt confirms he has been drinking to self-medicate. Pt denies any legal or medical issues. Pt denies active suicidality and states, \"It's usually in the morning or the night time when i feel it most.\" Pt reports \"cutting\" at the ages of 13-14. Pt is seeking IP voluntary tx at this time. CW and pt discussed a dual tx plan. Pt denies hx of W.D's other than shaking. Pt does not report any hx of trauma and or abuse. Pt is requesting no placement / referrals be sent to the [facility].     TDS, CW  "

## 2024-09-14 NOTE — LETTER
[facility] EMERGENCY DEPARTMENT  [address]  [place] [address]  Dept: [phone]      EMTALA TRANSFER CONSENT    NAME Prosper Baltazar                           2000                              MRN [id number]    I have been informed of my rights regarding examination, treatment, and transfer   by Dr. Gerardo Ayon DO    Benefits: Specialized equipment and/or services available at the receiving facility (Include comment)________________________    Risks: Potential for delay in receiving treatment      Consent for Transfer:  I acknowledge that my medical condition has been evaluated and explained to me by the emergency department physician or other qualified medical person and/or my attending physician, who has recommended that I be transferred to the service of  Accepting Physician: Dr. Madrid at Accepting Facility Name, City & State : [facility], [address] [place] [address]. The above potential benefits of such transfer, the potential risks associated with such transfer, and the probable risks of not being transferred have been explained to me, and I fully understand them.  The doctor has explained that, in my case, the benefits of transfer outweigh the risks.  I agree to be transferred.    I authorize the performance of emergency medical procedures and treatments upon me in both transit and upon arrival at the receiving facility.  Additionally, I authorize the release of any and all medical records to the receiving facility and request they be transported with me, if possible.  I understand that the safest mode of transportation during a medical emergency is an ambulance and that the Hospital advocates the use of this mode of transport. Risks of traveling to the receiving facility by car, including absence of medical control, life sustaining equipment, such as oxygen, and medical personnel has been explained to me and I fully understand them.    (DANIELLE  CORRECT BOX BELOW)  [X]  I consent to the stated transfer and to be transported by ambulance/helicopter.  [  ]  I consent to the stated transfer, but refuse transportation by ambulance and accept full responsibility for my transportation by car.  I understand the risks of non-ambulance transfers and I exonerate the Hospital and its staff from any deterioration in my condition that results from this refusal.    X___________________________________________    DATE  24  TIME________  Signature of patient or legally responsible individual signing on patient behalf           RELATIONSHIP TO PATIENT_________________________                  Provider Certification    NAME Prosper Baltazar                                    2000                              MRN [id number]    A medical screening exam was performed on the above named patient.  Based on the examination:    Condition Necessitating Transfer The primary encounter diagnosis was Suicidal ideation. A diagnosis of Alcohol intoxication (HCC) was also pertinent to this visit.    Patient Condition: The patient has been stabilized such that within reasonable medical probability, no material deterioration of the patient condition or the condition of the unborn child(donn) is likely to result from the transfer    Reason for Transfer: Level of Care needed not available at this facility    Transfer Requirements: [facility], [address] [address]   Space available and qualified personnel available for treatment as acknowledged by Sweetie Cortes, , [phone]  Agreed to accept transfer and to provide appropriate medical treatment as acknowledged by       Dr. Madrid  Appropriate medical records of the examination and treatment of the patient are provided at the time of transfer   STAFF INITIAL WHEN COMPLETED _______  Transfer will be performed by qualified personnel from                    and appropriate transfer equipment as required,  including the use of necessary and appropriate life support measures.    Provider Certification: I have examined the patient and explained the following risks and benefits of being transferred/refusing transfer to the patient/family:         Based on these reasonable risks and benefits to the patient and/or the unborn child(donn), and based upon the information available at the time of the patient’s examination, I certify that the medical benefits reasonably to be expected from the provision of appropriate medical treatments at another medical facility outweigh the increasing risks, if any, to the individual’s medical condition, and in the case of labor to the unborn child, from effecting the transfer.    X____________________________________________ DATE 09/14/24        TIME_______      ORIGINAL - SEND TO MEDICAL RECORDS   COPY - SEND WITH PATIENT DURING TRANSFER

## 2024-09-14 NOTE — ED NOTES
Breathalyzer was 0.088%      Susana Ruiz  09/14/24 1129       Susana Ruiz  09/14/24 1241       Susana Ruiz  09/14/24 1241

## (undated) DEVICE — INTENDED FOR TISSUE SEPARATION, AND OTHER PROCEDURES THAT REQUIRE A SHARP SURGICAL BLADE TO PUNCTURE OR CUT.: Brand: BARD-PARKER SAFETY BLADES SIZE 11, STERILE

## (undated) DEVICE — BASIC DOUBLE BASIN 2-LF: Brand: MEDLINE INDUSTRIES, INC.

## (undated) DEVICE — TROCAR: Brand: KII FIOS FIRST ENTRY

## (undated) DEVICE — Device

## (undated) DEVICE — TROCAR: Brand: KII® SLEEVE

## (undated) DEVICE — ADHESIVE SKIN HIGH VISCOSITY EXOFIN 1ML

## (undated) DEVICE — SUT VICRYL 0 UR-6 27 IN J603H

## (undated) DEVICE — GLOVE SRG BIOGEL 8

## (undated) DEVICE — GLOVE INDICATOR PI UNDERGLOVE SZ 7.5 BLUE

## (undated) DEVICE — CHLORAPREP HI-LITE 26ML ORANGE

## (undated) DEVICE — TUBING SMOKE EVAC W/FILTRATION DEVICE PLUMEPORT ACTIV

## (undated) DEVICE — INSUFFLATION NEEDLE TO ESTABLISH PNEUMOPERITONEUM.: Brand: INSUFFLATION NEEDLE

## (undated) DEVICE — PACK GENERAL LF

## (undated) DEVICE — HARMONIC ACE 5MM DIAMETER SHEARS 36CM SHAFT LENGTH + ADAPTIVE TISSUE TECHNOLOGY FOR USE WITH GENERATOR G11: Brand: HARMONIC ACE

## (undated) DEVICE — TISSUE RETRIEVAL SYSTEM: Brand: INZII RETRIEVAL SYSTEM

## (undated) DEVICE — DRAPE UTILITY

## (undated) DEVICE — TIBURON GENERAL ENDOSCOPY DRAPE: Brand: CONVERTORS

## (undated) DEVICE — SUT MONOCRYL 4-0 PS-2 27 IN Y426H

## (undated) DEVICE — PDS II VLT 0 107CM AG ST3: Brand: ENDOLOOP